# Patient Record
Sex: MALE | URBAN - METROPOLITAN AREA
[De-identification: names, ages, dates, MRNs, and addresses within clinical notes are randomized per-mention and may not be internally consistent; named-entity substitution may affect disease eponyms.]

---

## 2024-10-17 ENCOUNTER — TELEPHONE (OUTPATIENT)
Dept: GASTROENTEROLOGY | Age: 64
End: 2024-10-17

## 2024-10-17 ENCOUNTER — PREP FOR PROCEDURE (OUTPATIENT)
Dept: GASTROENTEROLOGY | Age: 64
End: 2024-10-17

## 2024-10-17 DIAGNOSIS — Z12.11 ENCOUNTER FOR SCREENING COLONOSCOPY: ICD-10-CM

## 2024-10-17 NOTE — TELEPHONE ENCOUNTER
[facility] mailed [facility] instructions out to patients address             GASTROENTEROLOGY   MIRALAX/GATORADE PREP            You are scheduled for a colonoscopy on ______12/30/2024_____________________     Items to purchase at pharmacy several days before your test:   Dulcolax tablets-- (LAXATIVE ONLY- not stool softener)   238-gram bottle of MiraLAX   64 ounces Gatorade, Crystal Lite, or apple juice (NO RED BLUE OR PURPLE IN COLOR)      The Day before your test: 12/29/2024  Clear liquids only the entire day (water, coffee, tea, soda, Jell-O, ice popsicles, broth, apple juice, etc.)   NO MILK, CREAMER, OR DAIRY PRODUCTS. NO SOLID FOODS. NOTHING RED, BLUE, OR PURPLE IN COLOR.   Mix MiraLAX and Gatorade together, shake the solution until the MiraLAX is dissolved. Chill before drinking.    At 5:00pm Start drinking MiraLAX solution until half (½) is gone, put remainder in refrigerator.    At 5:00 pm take 2x Dulcolax tablets with 16 oz of water   NO MORE LIQUIDS AFTER MIDNIGHT (other than 2ND part of MiraLAX solution)       The day of your test:       6 HOURS PRIOR TO PROCEDURE, drink other half of MiraLAX solution until gone.      NO MORE LIQUIDS UNTIL AFTER PROCEDURE        IMPORTANT: IF YOU DO NOT FOLLOW THESE DIRECTIONS, YOUR COLONOSCOPY COULD BE CANCELED DUE TO HAVING AN UNCLEAR PREP      GETTING READY FOR YOUR COLONOSCOPY    [address] [id number] [phone]   You will need to  your bowel prep from your local pharmacy.      You must arrange for someone 18 years or older to come with you to and from your colonoscopy and stay during your visit to drive you home. Your colonoscopy may need to be rescheduled if you do not have a .      Check with your insurance company before your procedure to ensure coverage and plan to bring copays if necessary.      You will be getting a call from our

## 2024-10-18 RX ORDER — SODIUM CHLORIDE 9 MG/ML
25 INJECTION, SOLUTION INTRAVENOUS PRN
Status: CANCELLED | OUTPATIENT
Start: 2024-10-18

## 2024-10-18 RX ORDER — SODIUM CHLORIDE 0.9 % (FLUSH) 0.9 %
5-40 SYRINGE (ML) INJECTION PRN
Status: CANCELLED | OUTPATIENT
Start: 2024-10-18

## 2024-10-18 RX ORDER — SODIUM CHLORIDE 0.9 % (FLUSH) 0.9 %
5-40 SYRINGE (ML) INJECTION EVERY 12 HOURS SCHEDULED
Status: CANCELLED | OUTPATIENT
Start: 2024-10-18

## 2024-11-16 PROBLEM — Z12.11 ENCOUNTER FOR SCREENING COLONOSCOPY: Status: RESOLVED | Noted: 2024-10-17 | Resolved: 2024-11-16

## 2024-11-27 PROBLEM — Z12.11 ENCOUNTER FOR SCREENING COLONOSCOPY: Status: ACTIVE | Noted: 2024-10-17

## 2024-12-05 ENCOUNTER — TELEPHONE (OUTPATIENT)
Dept: GASTROENTEROLOGY | Age: 64
End: 2024-12-05

## 2024-12-05 NOTE — TELEPHONE ENCOUNTER
Called patient to reschedule surgery from 12/30/24 with Ertem   (due to physician schedule change ) / lynda

## 2024-12-06 ENCOUNTER — TELEPHONE (OUTPATIENT)
Dept: GASTROENTEROLOGY | Age: 64
End: 2024-12-06

## 2024-12-06 NOTE — TELEPHONE ENCOUNTER
Called patient to reschedule surgery from 12/30/24 with Kelsey made patient aware we are having to cancel his surgery on 12/30/24 and to please call the office back to reschedule  (due to physician schedule change ) / lynda